# Patient Record
Sex: MALE | Race: WHITE | URBAN - METROPOLITAN AREA
[De-identification: names, ages, dates, MRNs, and addresses within clinical notes are randomized per-mention and may not be internally consistent; named-entity substitution may affect disease eponyms.]

---

## 2017-01-19 ENCOUNTER — RADIANT APPOINTMENT (OUTPATIENT)
Dept: GENERAL RADIOLOGY | Facility: CLINIC | Age: 27
End: 2017-01-19
Attending: FAMILY MEDICINE
Payer: COMMERCIAL

## 2017-01-19 ENCOUNTER — OFFICE VISIT (OUTPATIENT)
Dept: ORTHOPEDICS | Facility: CLINIC | Age: 27
End: 2017-01-19
Payer: COMMERCIAL

## 2017-01-19 VITALS — HEIGHT: 69 IN | WEIGHT: 192 LBS | BODY MASS INDEX: 28.44 KG/M2

## 2017-01-19 DIAGNOSIS — S62.025D CLOSED NONDISPLACED FRACTURE OF MIDDLE THIRD OF SCAPHOID OF LEFT WRIST WITH ROUTINE HEALING, SUBSEQUENT ENCOUNTER: Primary | ICD-10-CM

## 2017-01-19 DIAGNOSIS — S62.025D CLOSED NONDISPLACED FRACTURE OF MIDDLE THIRD OF SCAPHOID OF LEFT WRIST WITH ROUTINE HEALING, SUBSEQUENT ENCOUNTER: ICD-10-CM

## 2017-01-19 PROCEDURE — 99207 ZZC FRACTURE CARE IN GLOBAL PERIOD: CPT | Performed by: FAMILY MEDICINE

## 2017-01-19 PROCEDURE — 73110 X-RAY EXAM OF WRIST: CPT | Mod: LT | Performed by: FAMILY MEDICINE

## 2017-01-19 PROCEDURE — 29075 APPL CST ELBW FNGR SHORT ARM: CPT | Mod: 58 | Performed by: FAMILY MEDICINE

## 2017-01-19 NOTE — PATIENT INSTRUCTIONS
Assessment:  1. Closed nondisplaced fracture of middle third of scaphoid of left wrist with routine healing, subsequent encounter        Plan:  application of a thumb spica cast  Follow up: 4 weeks for repeat x-rays out of cast       Caring for Your Cast     A cast is used to protect an injured body part and allow it to heal by limiting the amount of motion occurring around the injury. Pain and swelling of the injured area is normal for 48 hours after your cast is put on. If you have swelling, wiggle your toes or fingers to ease it. Doing so encourages blood flow to your arm or leg.     It is important that you keep your cast dry, unless your doctor tells you differently. If the padding of the cast gets wet, your skin may be damaged and become infected. When showering or taking a bath, put the cast in a heavy plastic bag that can be held in place with a rubber band. If your cast gets wet and does not dry out in four to five hours, call your doctor s office.   To keep the cast clean, use wash clothes or baby wipes around it.   You may experience some itching inside the cast. This is normal. Avoid putting anything in the cast, even your finger, as you can injure your skin and cause infection. Try shaking some talcum powder or blowing cool air from a hair dryer into the cast to ease itching.   If these signs or symptoms develop, call your doctor immediately.       Pain gets worse     Swelling that cuts off blood flow that does not go away, even when you lift the body part above the level of your heart     Fever after itching. It may be related to an infection.     Fluid draining from your skin under the cast     Your cast may become loose as swelling goes down. If the cast feels too loose or if it is so loose you can take it off, call your doctor s office.     Your doctor or  will give you recommendations for activity based on your injury. Some sports allow casts if properly padded by a doctor or  .     For complete healing, your cast should only be removed at the direction of your doctor or clinic staff. A special saw ensures its safe removal and protects the skin and other tissue under the cast.

## 2017-01-19 NOTE — Clinical Note
Here is an update on your patient that was seen at [facility] in [place].   Please let us know if you have any questions.
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
## 2017-02-16 ENCOUNTER — RADIANT APPOINTMENT (OUTPATIENT)
Dept: GENERAL RADIOLOGY | Facility: CLINIC | Age: 27
End: 2017-02-16
Attending: FAMILY MEDICINE
Payer: COMMERCIAL

## 2017-02-16 ENCOUNTER — OFFICE VISIT (OUTPATIENT)
Dept: ORTHOPEDICS | Facility: CLINIC | Age: 27
End: 2017-02-16
Payer: COMMERCIAL

## 2017-02-16 VITALS
WEIGHT: 192 LBS | HEIGHT: 69 IN | BODY MASS INDEX: 28.44 KG/M2 | SYSTOLIC BLOOD PRESSURE: 104 MMHG | DIASTOLIC BLOOD PRESSURE: 60 MMHG

## 2017-02-16 DIAGNOSIS — S62.025D CLOSED NONDISPLACED FRACTURE OF MIDDLE THIRD OF SCAPHOID OF LEFT WRIST WITH ROUTINE HEALING, SUBSEQUENT ENCOUNTER: Primary | ICD-10-CM

## 2017-02-16 DIAGNOSIS — S62.025D CLOSED NONDISPLACED FRACTURE OF MIDDLE THIRD OF SCAPHOID OF LEFT WRIST WITH ROUTINE HEALING, SUBSEQUENT ENCOUNTER: ICD-10-CM

## 2017-02-16 PROCEDURE — 73110 X-RAY EXAM OF WRIST: CPT | Mod: LT | Performed by: FAMILY MEDICINE

## 2017-02-16 PROCEDURE — 99207 ZZC FRACTURE CARE IN GLOBAL PERIOD: CPT | Performed by: FAMILY MEDICINE
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
## 2017-02-16 NOTE — PATIENT INSTRUCTIONS
Assessment:  1. Closed nondisplaced fracture of middle third of scaphoid of left wrist with routine healing, subsequent encounter        Plan:  CT of the left wrist    Please call  [phone] to schedule your appointment if you have not heard from them in two business days  If you need to cancel or change the appointment please call [phone]   Will call with results

## 2017-02-16 NOTE — Clinical Note
Here is an update on your patient that was seen at [facility] in [place].   Please let us know if you have any questions.

## 2017-02-16 NOTE — NURSING NOTE
"Chief Complaint   Patient presents with     RECHECK     scaphoid fx       Initial /60  Ht 5' 9\" (1.753 m)  Wt 192 lb (87.1 kg)  BMI 28.35 kg/m2 Estimated body mass index is 28.35 kg/(m^2) as calculated from the following:    Height as of this encounter: 5' 9\" (1.753 m).    Weight as of this encounter: 192 lb (87.1 kg).  Medication Reconciliation: complete     Leon Blount, ATC      "

## 2017-02-16 NOTE — MR AVS SNAPSHOT
After Visit Summary   2/16/2017    Tim Sorto    MRN: [id number]           Patient Information     Date Of Birth          1990        Visit Information        Provider Department      2/16/2017 10:00 AM Yvon Bush MD [facility]        Today's Diagnoses     Closed nondisplaced fracture of middle third of scaphoid of left wrist with routine healing, subsequent encounter    -  1      Care Instructions    Assessment:  1. Closed nondisplaced fracture of middle third of scaphoid of left wrist with routine healing, subsequent encounter        Plan:  CT of the left wrist    Please call  [phone] to schedule your appointment if you have not heard from them in two business days  If you need to cancel or change the appointment please call [phone]   Will call with results          Follow-ups after your visit        Follow-up notes from your care team     Call patient with results      Future tests that were ordered for you today     Open Future Orders        Priority Expected Expires Ordered    CT Upper Extremity Left w/o Contrast Routine  2/16/2018 2/16/2017            Who to contact     If you have questions or need follow up information about today's clinic visit or your schedule please contact [facility] directly at [phone].  Normal or non-critical lab and imaging results will be communicated to you by MyChart, letter or phone within 4 business days after the clinic has received the results. If you do not hear from us within 7 days, please contact the clinic through MyChart or phone. If you have a critical or abnormal lab result, we will notify you by phone as soon as possible.  Submit refill requests through [employer] or call your pharmacy and they will forward the refill request to us. Please allow 3 business days for your refill to be completed.          Additional Information About  "Your Visit        MyChart Information     [employer] lets you send messages to your doctor, view your test results, renew your prescriptions, schedule appointments and more. To sign up, go to [email or web address] . Click on \"Log in\" on the left side of the screen, which will take you to the Welcome page. Then click on \"Sign up Now\" on the right side of the page.     You will be asked to enter the access code listed below, as well as some personal information. Please follow the directions to create your username and password.     Your access code is: [email or web address]  Expires: 2017 10:01 AM     Your access code will  in 90 days. If you need help or a new code, please call your [place] clinic or [phone].        Care EveryWhere ID     This is your Care EveryWhere ID. This could be used by other organizations to access your [place] medical records  [id number]        Your Vitals Were     Height BMI (Body Mass Index)                5' 9\" (1.753 m) 28.35 kg/m2           Blood Pressure from Last 3 Encounters:   17 104/60   16 122/82   16 122/82    Weight from Last 3 Encounters:   17 192 lb (87.1 kg)   17 192 lb (87.1 kg)   16 192 lb (87.1 kg)               Primary Care Provider Office Phone # Fax #    Marnie Finch [phone]       [facility] [address] [place] [address]        Thank you!     Thank you for choosing [facility]  for your care. Our goal is always to provide you with excellent care. Hearing back from our patients is one way we can continue to improve our services. Please take a few minutes to complete the written survey that you may receive in the mail after your visit with us. Thank you!             Your Updated Medication List - Protect others around you: Learn how to safely use, store and throw away your medicines at www.disposemymeds.org.          This list " is accurate as of: 2/16/17 10:55 AM.  Always use your most recent med list.                   Brand Name Dispense Instructions for use    NO ACTIVE MEDICATIONS          order for DME     1 Units    Equipment being ordered: thumb spica

## 2017-02-17 ENCOUNTER — HOSPITAL ENCOUNTER (OUTPATIENT)
Dept: CT IMAGING | Facility: CLINIC | Age: 27
Discharge: HOME | End: 2017-02-17
Attending: FAMILY MEDICINE | Admitting: FAMILY MEDICINE
Payer: COMMERCIAL

## 2017-02-17 DIAGNOSIS — S62.025D CLOSED NONDISPLACED FRACTURE OF MIDDLE THIRD OF SCAPHOID OF LEFT WRIST WITH ROUTINE HEALING, SUBSEQUENT ENCOUNTER: ICD-10-CM

## 2017-02-17 PROCEDURE — 73200 CT UPPER EXTREMITY W/O DYE: CPT | Mod: LT

## 2017-02-21 ENCOUNTER — TELEPHONE (OUTPATIENT)
Dept: ORTHOPEDICS | Facility: CLINIC | Age: 27
End: 2017-02-21

## 2017-02-21 NOTE — TELEPHONE ENCOUNTER
Patient left voice mail requesting call back to discuss CT results for his wrist.    Per Dr. Bush he is waiting for call back from surgeon regarding his case.    Leon Blount, ATC

## 2017-02-21 NOTE — TELEPHONE ENCOUNTER
Call placed to patient to inform him that we are waiting to hear back from a hand surgeon before determining how to proceed.    Leon Blount, ATC

## 2017-02-22 NOTE — TELEPHONE ENCOUNTER
Reviewed with hand ortho and discussed with pt.    Fracture site healing well; revascularizing anticipated as bone continues to heal. May d/c brace; advised to avoid strenuous and high impact activity; repeat xr in 3 months necessary to insure healing. Pt agreed to return in late May for recheck and repeat xr.